# Patient Record
Sex: MALE | Race: WHITE | NOT HISPANIC OR LATINO | URBAN - METROPOLITAN AREA
[De-identification: names, ages, dates, MRNs, and addresses within clinical notes are randomized per-mention and may not be internally consistent; named-entity substitution may affect disease eponyms.]

---

## 2017-04-05 ENCOUNTER — IMPORTED ENCOUNTER (OUTPATIENT)
Dept: URBAN - METROPOLITAN AREA CLINIC 9 | Facility: CLINIC | Age: 77
End: 2017-04-05

## 2017-11-15 ENCOUNTER — IMPORTED ENCOUNTER (OUTPATIENT)
Dept: URBAN - METROPOLITAN AREA CLINIC 9 | Facility: CLINIC | Age: 77
End: 2017-11-15

## 2017-12-05 ENCOUNTER — IMPORTED ENCOUNTER (OUTPATIENT)
Dept: URBAN - METROPOLITAN AREA CLINIC 9 | Facility: CLINIC | Age: 77
End: 2017-12-05

## 2018-04-10 ENCOUNTER — IMPORTED ENCOUNTER (OUTPATIENT)
Dept: URBAN - METROPOLITAN AREA CLINIC 9 | Facility: CLINIC | Age: 78
End: 2018-04-10

## 2018-08-14 ENCOUNTER — IMPORTED ENCOUNTER (OUTPATIENT)
Dept: URBAN - METROPOLITAN AREA CLINIC 9 | Facility: CLINIC | Age: 78
End: 2018-08-14

## 2018-12-13 ENCOUNTER — IMPORTED ENCOUNTER (OUTPATIENT)
Dept: URBAN - METROPOLITAN AREA CLINIC 9 | Facility: CLINIC | Age: 78
End: 2018-12-13

## 2018-12-18 ENCOUNTER — IMPORTED ENCOUNTER (OUTPATIENT)
Dept: URBAN - METROPOLITAN AREA CLINIC 9 | Facility: CLINIC | Age: 78
End: 2018-12-18

## 2019-06-18 ENCOUNTER — IMPORTED ENCOUNTER (OUTPATIENT)
Dept: URBAN - METROPOLITAN AREA CLINIC 9 | Facility: CLINIC | Age: 79
End: 2019-06-18

## 2019-12-04 ENCOUNTER — IMPORTED ENCOUNTER (OUTPATIENT)
Dept: URBAN - METROPOLITAN AREA CLINIC 9 | Facility: CLINIC | Age: 79
End: 2019-12-04

## 2019-12-11 ENCOUNTER — IMPORTED ENCOUNTER (OUTPATIENT)
Dept: URBAN - METROPOLITAN AREA CLINIC 9 | Facility: CLINIC | Age: 79
End: 2019-12-11

## 2020-02-14 ENCOUNTER — IMPORTED ENCOUNTER (OUTPATIENT)
Dept: URBAN - METROPOLITAN AREA CLINIC 9 | Facility: CLINIC | Age: 80
End: 2020-02-14

## 2020-02-28 ENCOUNTER — IMPORTED ENCOUNTER (OUTPATIENT)
Dept: URBAN - METROPOLITAN AREA CLINIC 9 | Facility: CLINIC | Age: 80
End: 2020-02-28

## 2020-05-05 ENCOUNTER — IMPORTED ENCOUNTER (OUTPATIENT)
Dept: URBAN - METROPOLITAN AREA CLINIC 9 | Facility: CLINIC | Age: 80
End: 2020-05-05

## 2020-09-08 ENCOUNTER — IMPORTED ENCOUNTER (OUTPATIENT)
Dept: URBAN - METROPOLITAN AREA CLINIC 9 | Facility: CLINIC | Age: 80
End: 2020-09-08

## 2020-12-09 NOTE — PATIENT DISCUSSION
MYOPIA, OU-  DISC THAT LASIK TARGETED FOR DVA WOULD RENDER PT COMPLETELY DEPENDENT ON  GLS FOR ALL INTERMEDIATE/NEAR VA.

## 2020-12-09 NOTE — PATIENT DISCUSSION
MYOPIA/ASTIGMATISM, OU - PATIENT GOOD CANDIDATE FOR LASIK OU. DISC OPT OF REFRACTIVE [id number]. RTC FOR LASIK MEASUREMENT COMPLETION WHEN READY.

## 2020-12-10 NOTE — PATIENT DISCUSSION
New Prescription: gatifloxacin (gatifloxacin): drops: 0.5% 1 drop four times a day into both eyes 12-

## 2020-12-10 NOTE — PATIENT DISCUSSION
New Prescription: prednisolone acetate (prednisolone acetate): drops,suspension: 1% 1 drop four times a day as directed into both eyes 12-

## 2020-12-10 NOTE — PATIENT DISCUSSION
Patient met the inclusion/exclusion criteria for the [id number] [facility] LASIK study participation and was enrolled.

## 2020-12-21 NOTE — PATIENT DISCUSSION
The InnovEyes software and [employer] sightmap are investigational. Investigational means that the device or software has not been approved by the [facility] and [facility] [employer] (FDA) or any other [place] regulatory body. The InnovEyes treatment is already approved in [facility].

## 2020-12-21 NOTE — PATIENT DISCUSSION
Research Counseling: It was explained to the patient that the  purpose of this research study is to obtain information on the safety and effectiveness of the WaveLight  laser using [employer] along with [facility] to correct refractive errors for the reduction or elimination of nearsightedness. WaveLight [id number] laser system is an FDA approved device and laser eye surgery has been performed routinely by your study doctor.

## 2020-12-21 NOTE — PATIENT DISCUSSION
DISC WITH PT THAT WE WILL BE PROCEEDING WITH LASIK THAT IS PART OF A STUDY AND WE WILL BE USING RAY TRACING.

## 2020-12-22 NOTE — PATIENT DISCUSSION
Continue: prednisolone acetate (prednisolone acetate): drops,suspension: 1% 1 drop four times a day as directed into both eyes 12-

## 2020-12-30 ENCOUNTER — IMPORTED ENCOUNTER (OUTPATIENT)
Dept: URBAN - METROPOLITAN AREA CLINIC 9 | Facility: CLINIC | Age: 80
End: 2020-12-30

## 2021-01-06 ENCOUNTER — IMPORTED ENCOUNTER (OUTPATIENT)
Dept: URBAN - METROPOLITAN AREA CLINIC 9 | Facility: CLINIC | Age: 81
End: 2021-01-06

## 2021-05-04 ENCOUNTER — IMPORTED ENCOUNTER (OUTPATIENT)
Dept: URBAN - METROPOLITAN AREA CLINIC 9 | Facility: CLINIC | Age: 81
End: 2021-05-04

## 2021-09-15 ENCOUNTER — IMPORTED ENCOUNTER (OUTPATIENT)
Dept: URBAN - METROPOLITAN AREA CLINIC 9 | Facility: CLINIC | Age: 81
End: 2021-09-15

## 2021-10-18 ASSESSMENT — VISUAL ACUITY
OD_SC: 20/70 - SN
OD_SC: 20/70 - SN
OD_SC: 20/70 + SN
OS_SC: 20/25 SN
OD_SC: 20/70 SN
OD_SC: 20/70 - SN
OS_SC: 20/25 + SN
OD_PH: 20/60 - SN
OS_SC: 20/25 - SN
OD_SC: 20/70 SN
OD_PH: 20/50 -2 SN
OS_SC: 20/25 SN
OS_SC: 20/30 + SN
OD_SC: 20/100 SN
OS_SC: 20/25 - SN
OS_SC: 20/25 -2 SN
OS_SC: 20/25 -2 SN
OD_SC: 20/80 + SN
OD_SC: 20/100 SN
OD_SC: 20/70 - SN
OS_SC: 20/30 - SN
OD_PH: 20/60 SN
OS_SC: 20/25 SN
OS_SC: 20/25 - SN
OS_SC: 20/25 -2 SN
OD_SC: 20/70 SN
OD_SC: 20/50 -2 SN
OS_SC: 20/30 SN
OD_SC: 20/80 + SN
OD_SC: 20/70 + SN

## 2021-10-18 ASSESSMENT — TONOMETRY
OS_IOP_MMHG: 20
OD_IOP_MMHG: 19
OD_IOP_MMHG: 16
OD_IOP_MMHG: 15
OD_IOP_MMHG: 14
OS_IOP_MMHG: 19
OD_IOP_MMHG: 16
OS_IOP_MMHG: 15
OD_IOP_MMHG: 14
OS_IOP_MMHG: 15
OD_IOP_MMHG: 16
OS_IOP_MMHG: 19
OD_IOP_MMHG: 14
OS_IOP_MMHG: 21
OS_IOP_MMHG: 12
OD_IOP_MMHG: 20
OD_IOP_MMHG: 17
OD_IOP_MMHG: 14
OD_IOP_MMHG: 15
OD_IOP_MMHG: 17
OS_IOP_MMHG: 15
OS_IOP_MMHG: 14
OS_IOP_MMHG: 16
OS_IOP_MMHG: 15
OS_IOP_MMHG: 16
OS_IOP_MMHG: 18
OS_IOP_MMHG: 15
OD_IOP_MMHG: 17

## 2022-01-20 ENCOUNTER — TECH ONLY (OUTPATIENT)
Dept: URBAN - METROPOLITAN AREA CLINIC 14 | Facility: CLINIC | Age: 82
End: 2022-01-20

## 2022-01-20 DIAGNOSIS — H40.1131: ICD-10-CM

## 2022-01-20 PROCEDURE — 92083 EXTENDED VISUAL FIELD XM: CPT

## 2022-01-25 ENCOUNTER — ESTABLISHED PATIENT (OUTPATIENT)
Dept: URBAN - METROPOLITAN AREA CLINIC 14 | Facility: CLINIC | Age: 82
End: 2022-01-25

## 2022-01-25 DIAGNOSIS — H35.3131: ICD-10-CM

## 2022-01-25 DIAGNOSIS — E11.3293: ICD-10-CM

## 2022-01-25 DIAGNOSIS — H04.123: ICD-10-CM

## 2022-01-25 DIAGNOSIS — H40.1131: ICD-10-CM

## 2022-01-25 PROCEDURE — 99214 OFFICE O/P EST MOD 30 MIN: CPT

## 2022-01-25 PROCEDURE — 92134 CPTRZ OPH DX IMG PST SGM RTA: CPT

## 2022-01-25 ASSESSMENT — VISUAL ACUITY
OS_SC: 20/25
OD_SC: 20/80-1

## 2022-01-25 ASSESSMENT — TONOMETRY
OS_IOP_MMHG: 13
OD_IOP_MMHG: 12

## 2022-02-02 NOTE — PATIENT DISCUSSION
No adverse events were seen during the duration of the trial. Please note that the ocular medications, systemic medications, and allergy list were not updated at the time of this note.

## 2022-07-02 RX ORDER — POTASSIUM CITRATE 10 MEQ/1
TABLET, EXTENDED RELEASE ORAL
COMMUNITY

## 2022-07-02 RX ORDER — HYDROCHLOROTHIAZIDE 25 MG/1
TABLET ORAL
COMMUNITY

## 2022-07-02 RX ORDER — TIMOLOL MALEATE 5 MG/ML
SOLUTION/ DROPS OPHTHALMIC
COMMUNITY

## 2022-07-02 RX ORDER — BIMATOPROST 0.01 %
DROPS OPHTHALMIC (EYE)
COMMUNITY

## 2022-07-02 RX ORDER — SIMVASTATIN 10 MG
TABLET ORAL
COMMUNITY

## 2022-07-02 RX ORDER — IRBESARTAN 300 MG/1
TABLET ORAL
COMMUNITY

## 2022-07-02 RX ORDER — AMLODIPINE BESYLATE 10 MG/1
TABLET ORAL
COMMUNITY

## 2022-07-02 RX ORDER — GLIPIZIDE 10 MG/1
TABLET ORAL
COMMUNITY

## 2022-07-26 ENCOUNTER — ESTABLISHED PATIENT (OUTPATIENT)
Dept: URBAN - METROPOLITAN AREA CLINIC 14 | Facility: CLINIC | Age: 82
End: 2022-07-26

## 2022-07-26 DIAGNOSIS — H35.3131: ICD-10-CM

## 2022-07-26 DIAGNOSIS — H04.123: ICD-10-CM

## 2022-07-26 DIAGNOSIS — E11.9: ICD-10-CM

## 2022-07-26 DIAGNOSIS — H40.1131: ICD-10-CM

## 2022-07-26 PROCEDURE — 99213 OFFICE O/P EST LOW 20 MIN: CPT

## 2022-07-26 PROCEDURE — 92133 CPTRZD OPH DX IMG PST SGM ON: CPT

## 2022-07-26 ASSESSMENT — VISUAL ACUITY
OS_SC: 20/25
OD_SC: 20/100

## 2022-07-26 ASSESSMENT — TONOMETRY
OS_IOP_MMHG: 13
OD_IOP_MMHG: 14

## 2023-08-22 ENCOUNTER — ESTABLISHED PATIENT (OUTPATIENT)
Dept: URBAN - METROPOLITAN AREA CLINIC 14 | Facility: CLINIC | Age: 83
End: 2023-08-22

## 2023-08-22 DIAGNOSIS — H40.1131: ICD-10-CM

## 2023-08-22 PROCEDURE — 92133 CPTRZD OPH DX IMG PST SGM ON: CPT

## 2023-08-22 PROCEDURE — 99213 OFFICE O/P EST LOW 20 MIN: CPT

## 2023-08-22 ASSESSMENT — VISUAL ACUITY
OU_SC: 20/25-2
OS_SC: 20/25-2
OD_SC: 20/60+1

## 2023-08-22 ASSESSMENT — TONOMETRY
OD_IOP_MMHG: 11
OS_IOP_MMHG: 11

## 2024-02-15 ENCOUNTER — DIAGNOSTICS ONLY (OUTPATIENT)
Dept: URBAN - METROPOLITAN AREA CLINIC 14 | Facility: CLINIC | Age: 84
End: 2024-02-15

## 2024-02-15 DIAGNOSIS — H40.1131: ICD-10-CM

## 2024-02-15 PROCEDURE — 92083 EXTENDED VISUAL FIELD XM: CPT

## 2024-02-21 ENCOUNTER — ESTABLISHED PATIENT (OUTPATIENT)
Dept: URBAN - METROPOLITAN AREA CLINIC 14 | Facility: CLINIC | Age: 84
End: 2024-02-21

## 2024-02-21 DIAGNOSIS — H35.3131: ICD-10-CM

## 2024-02-21 DIAGNOSIS — H02.834: ICD-10-CM

## 2024-02-21 DIAGNOSIS — H26.491: ICD-10-CM

## 2024-02-21 DIAGNOSIS — H53.031: ICD-10-CM

## 2024-02-21 DIAGNOSIS — H40.1131: ICD-10-CM

## 2024-02-21 DIAGNOSIS — E11.9: ICD-10-CM

## 2024-02-21 DIAGNOSIS — H02.831: ICD-10-CM

## 2024-02-21 DIAGNOSIS — Z96.1: ICD-10-CM

## 2024-02-21 DIAGNOSIS — H43.813: ICD-10-CM

## 2024-02-21 DIAGNOSIS — H04.123: ICD-10-CM

## 2024-02-21 PROCEDURE — 92134 CPTRZ OPH DX IMG PST SGM RTA: CPT

## 2024-02-21 PROCEDURE — 92014 COMPRE OPH EXAM EST PT 1/>: CPT

## 2024-02-21 ASSESSMENT — TONOMETRY
OD_IOP_MMHG: 13
OS_IOP_MMHG: 15

## 2024-02-21 ASSESSMENT — VISUAL ACUITY
OD_SC: 20/80
OS_SC: 20/25
OD_PH: 20/70

## 2024-09-04 ENCOUNTER — FOLLOW UP (OUTPATIENT)
Dept: URBAN - METROPOLITAN AREA CLINIC 14 | Facility: CLINIC | Age: 84
End: 2024-09-04

## 2024-09-04 DIAGNOSIS — H40.1131: ICD-10-CM

## 2024-09-04 PROCEDURE — 92133 CPTRZD OPH DX IMG PST SGM ON: CPT

## 2024-09-04 PROCEDURE — 99213 OFFICE O/P EST LOW 20 MIN: CPT

## 2025-02-19 ENCOUNTER — FOLLOW UP (OUTPATIENT)
Age: 85
End: 2025-02-19

## 2025-02-19 DIAGNOSIS — H40.1131: ICD-10-CM

## 2025-02-19 PROCEDURE — 92083 EXTENDED VISUAL FIELD XM: CPT

## 2025-03-19 ENCOUNTER — FOLLOW UP (OUTPATIENT)
Age: 85
End: 2025-03-19

## 2025-03-19 DIAGNOSIS — H40.1131: ICD-10-CM

## 2025-03-19 DIAGNOSIS — H35.3131: ICD-10-CM

## 2025-03-19 PROCEDURE — 92014 COMPRE OPH EXAM EST PT 1/>: CPT

## 2025-03-19 PROCEDURE — 92134 CPTRZ OPH DX IMG PST SGM RTA: CPT
